# Patient Record
Sex: FEMALE | URBAN - METROPOLITAN AREA
[De-identification: names, ages, dates, MRNs, and addresses within clinical notes are randomized per-mention and may not be internally consistent; named-entity substitution may affect disease eponyms.]

---

## 2018-02-07 ENCOUNTER — TELEPHONE (OUTPATIENT)
Dept: OTHER | Facility: CLINIC | Age: 28
End: 2018-02-07

## 2018-02-07 NOTE — TELEPHONE ENCOUNTER
2/7/2018    Call Regarding Onboarding P1 Other    Attempt 1    Message on voicemail    Comments:       Outreach   Agustina Watkins

## 2018-03-15 NOTE — TELEPHONE ENCOUNTER
3/15/2018    Call Regarding Onboarding P1 Other    Attempt 2    Message on voicemail     Comments:       Outreach   Jackelyn Price

## 2018-04-19 NOTE — TELEPHONE ENCOUNTER
4/19/2018    Call Regarding Onboarding P1 Other    Attempt 3    Message on voicemail     Comments: 0 DEP      Outreach   CC

## 2025-05-22 ENCOUNTER — TRANSCRIBE ORDERS (OUTPATIENT)
Dept: OTHER | Age: 35
End: 2025-05-22

## 2025-05-22 DIAGNOSIS — D35.2 PITUITARY ADENOMA (H): Primary | ICD-10-CM

## 2025-05-28 ENCOUNTER — TELEPHONE (OUTPATIENT)
Dept: OPHTHALMOLOGY | Facility: CLINIC | Age: 35
End: 2025-05-28
Payer: COMMERCIAL

## 2025-05-30 NOTE — PROGRESS NOTES
HPI:    Rebecca presents for baseline vision evaluation in the setting of pituitary adenoma with plans for surgical resection on 6/23/25.    Rebecca reports that she has not had any vision changes.  Her glasses prescription has fluctuated/worsened.  With her glasses on, she feels that she sees normally.  Denies any peripheral vision changes or double vision.    Review of outside testing:  MRI Brain [facility] 4/7/25:  1.  There is a large mass centered within the sella with suprasellar and left-sided sphenoid sinus extension. Although nonspecific appearance is most compatible with a large pituitary adenoma. Although the lesion partially fills the suprasellar cistern, no clear mass effect upon the optic nerves or chiasm is seen.   2.  No other finding for intracranial hemorrhage, mass, or acute infarct.     Review of outside notes:  [place] [place]:  Assessment & Plan   Ms. Rebecca Mckeon is a 35-year-old female that has a past medical history of Amenorrhea, Anemia, Asthma (HC), Class 2 severe obesity due to excess calories with serious comorbidity and body mass index (BMI) of 38.0 to 38.9 in adult (HC) (01/05/2009), Immune to varicella, and Type 2 diabetes mellitus without complication (HC) (02/17/2016). 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 She has uncertain ACTH status and sex hormone labs consistent with secondary amenorrhea with preserved visual fields on confrontation.    - I reviewed the imaging and endocrine status with Rebecca. Since she is having pituitary dysfunction secondary to her nonfunctioning macroadenoma, my recommendation is for surgical resection. We discussed the sublabial as well as the endonasal approach and my recommendation was to partner with Dr. Fuller of ENT for the endoscopic endonasal transsphenoidal approach to the tumor. We discussed the surgery, alternatives, potential benefits,  and potential risks. We discussed the anticipated perioperative course.    Since Rebecca still has ongoing workup of her ACTH status, I encouraged her to set up the stimulation test as soon as possible. There is some subtle trouble with finger counting on the temporal fields so while the lesion does not directly impact the chiasm I do want to obtain baseline visual fields and have placed a stat referral to neuro-ophthalmology to accomplish this.     Today's Testing:  OCT RNFL:  Right eye: Average RNFL 100 microns.  Borderline T thinning.  Left eye: Average RNFL 102 microns.  Borderline T thinning.  GCL 1.05/1.09    24-2 SF:  Right eye: Reliable.  Mean deviation -1.52 dB.  SN defect.  Left eye: Fairly reliable.  Mean deviation -1.33 dB.  Temporal misses, does not respect vertical midline.    Assessment and Plan:  Today, there is no definitive evidence of compressive optic neuropathy in the setting of pituitary adenoma with plans for surgical resection on 6/23/25.  Her visual acuity and color vision are preserved.  OCT retinal nerve fiber layers are intact and symmetric with borderline, temporal thinning in each eye.  Ganglion cell layers are normal.    Visual field testing not consistent with bitemporal field loss, however, Rebecca did miss some spots temporally in the LEFT eye.    I will follow-up with Rebecca 6-8 weeks post-op.  I am happy to see her sooner with any concerns/changes.    Complete documentation of historical and exam elements from today's encounter can be found in the full encounter summary report (not reduplicated in this progress note). I personally obtained the chief complaint(s) and history of present illness. I confirmed and edited as necessary the review of systems, past medical/surgical history, family history, social history, and examination findings as documented by others; and I examined the patient myself. I personally reviewed the relevant tests, images, and reports as documented above. I  formulated and edited as necessary the assessment and plan and discussed the findings and management plan with the patient and family.    Kandace Stern, OD

## 2025-06-02 ENCOUNTER — OFFICE VISIT (OUTPATIENT)
Dept: OPHTHALMOLOGY | Facility: CLINIC | Age: 35
End: 2025-06-02
Payer: COMMERCIAL

## 2025-06-02 DIAGNOSIS — D35.2 PITUITARY ADENOMA (H): Primary | ICD-10-CM

## 2025-06-02 PROCEDURE — 92083 EXTENDED VISUAL FIELD XM: CPT

## 2025-06-02 PROCEDURE — 92133 CPTRZD OPH DX IMG PST SGM ON: CPT

## 2025-06-02 ASSESSMENT — REFRACTION_WEARINGRX
OS_AXIS: 165
OS_SPHERE: -2.00
OD_CYLINDER: +0.25
SPECS_TYPE: 1 YEAR OLD
OD_SPHERE: -2.00
OS_CYLINDER: +0.25
OD_AXIS: 075

## 2025-06-02 ASSESSMENT — CUP TO DISC RATIO
OD_RATIO: 0.3
OS_RATIO: 0.25

## 2025-06-02 ASSESSMENT — CONF VISUAL FIELD
OS_INFERIOR_TEMPORAL_RESTRICTION: 0
OD_INFERIOR_TEMPORAL_RESTRICTION: 0
OD_NORMAL: 1
OS_INFERIOR_NASAL_RESTRICTION: 0
OD_SUPERIOR_TEMPORAL_RESTRICTION: 0
OS_NORMAL: 1
OD_INFERIOR_NASAL_RESTRICTION: 0
OD_SUPERIOR_NASAL_RESTRICTION: 0
OS_SUPERIOR_TEMPORAL_RESTRICTION: 0
OS_SUPERIOR_NASAL_RESTRICTION: 0

## 2025-06-02 ASSESSMENT — SLIT LAMP EXAM - LIDS
COMMENTS: NORMAL
COMMENTS: NORMAL

## 2025-06-02 ASSESSMENT — TONOMETRY
OD_IOP_MMHG: 22
OS_IOP_MMHG: 19
IOP_METHOD: TONOPEN

## 2025-06-02 ASSESSMENT — VISUAL ACUITY
OS_CC: 20/20
METHOD: SNELLEN - LINEAR
OD_CC: 20/20

## 2025-06-02 ASSESSMENT — EXTERNAL EXAM - LEFT EYE: OS_EXAM: NORMAL

## 2025-06-02 ASSESSMENT — EXTERNAL EXAM - RIGHT EYE: OD_EXAM: NORMAL
